# Patient Record
Sex: MALE | URBAN - METROPOLITAN AREA
[De-identification: names, ages, dates, MRNs, and addresses within clinical notes are randomized per-mention and may not be internally consistent; named-entity substitution may affect disease eponyms.]

---

## 2024-06-11 ENCOUNTER — TELEPHONE (OUTPATIENT)
Dept: UROLOGY | Facility: CLINIC | Age: 44
End: 2024-06-11

## 2024-06-11 NOTE — TELEPHONE ENCOUNTER
----- Message from Farhana Messina sent at 6/11/2024 12:18 PM CDT -----  Contact: Brian  Type:  Patient Call          Who Called: Patient         Does the patient know what this is regarding?: Requesting a call back to ask if he can be treated for peyronies at this location ; please advise           Would the patient rather a call back or a response via [facility]?call           Best Call Back Number:[phone]           Additional Information: pt have [employer] insurance